# Patient Record
Sex: MALE | Race: WHITE
[De-identification: names, ages, dates, MRNs, and addresses within clinical notes are randomized per-mention and may not be internally consistent; named-entity substitution may affect disease eponyms.]

---

## 2017-08-03 ENCOUNTER — HOSPITAL ENCOUNTER (OUTPATIENT)
Dept: HOSPITAL 58 - LAB | Age: 82
Discharge: HOME | End: 2017-08-03
Attending: GENERAL PRACTICE

## 2017-08-03 VITALS — BODY MASS INDEX: 26.9 KG/M2

## 2017-08-03 DIAGNOSIS — E03.9: Primary | ICD-10-CM

## 2017-08-03 DIAGNOSIS — Z79.899: ICD-10-CM

## 2017-08-03 DIAGNOSIS — I10: ICD-10-CM

## 2017-08-03 LAB
ADD URINE MICROSCOPIC: YES
ALBUMIN SERPL-MCNC: 4.4 G/DL (ref 3.4–5)
ALBUMIN/GLOB SERPL: 1.33 {RATIO}
ALP SERPL-CCNC: 88 U/L (ref 56–119)
ALT SERPL-CCNC: 26 U/L (ref 12–78)
ANION GAP SERPL CALC-SCNC: 18.7 MMOL/L
AST SERPL-CCNC: 27 U/L (ref 15–37)
BASOPHILS # BLD AUTO: 0.1 K/UL (ref 0–0.2)
BASOPHILS NFR BLD AUTO: 1.4 % (ref 0–3)
BILIRUB SERPL-MCNC: 0.73 MG/DL (ref 0–1.2)
BUN SERPL-MCNC: 23 MG/DL (ref 7–18)
BUN/CREAT SERPL: 20.35
CALCIUM SERPL-MCNC: 10.3 MG/DL (ref 8.2–10.2)
CHLORIDE SERPL-SCNC: 102 MMOL/L (ref 98–107)
CHOLEST SERPL-MCNC: 192 MG/DL (ref 0–200)
CHOLEST/HDLC SERPL: 5.5 {RATIO} (ref 4.5–6.4)
CO2 BLD-SCNC: 27 MMOL/L (ref 23–31)
CREAT SERPL-MCNC: 1.13 MG/DL (ref 0.6–1.1)
EOSINOPHIL # BLD AUTO: 0.2 K/UL (ref 0–0.7)
EOSINOPHIL NFR BLD AUTO: 5.6 % (ref 0–7)
GFR SERPLBLD BASED ON 1.73 SQ M-ARVRAT: 62 ML/MIN
GLOBULIN SER CALC-MCNC: 3.3 G/L
GLUCOSE SERPL-MCNC: 102 MG/DL (ref 82–115)
HCT VFR BLD AUTO: 45.3 % (ref 42–52)
HDLC SERPL-MCNC: 35 MG/DL (ref 35–60)
HGB BLD-MCNC: 15.4 G/DL (ref 14–18)
IMM GRANULOCYTES NFR BLD AUTO: 0.2 % (ref 0–5)
LYMPHOCYTES # SPEC AUTO: 1.4 K/UL (ref 0.6–3.4)
LYMPHOCYTES NFR BLD AUTO: 33 % (ref 10–50)
MCH RBC QN: 30.5 PG (ref 27–31)
MCHC RBC AUTO-ENTMCNC: 34 G/DL (ref 31.8–35.4)
MCV RBC: 89.7 FL (ref 80–94)
MONOCYTES # BLD AUTO: 0.4 K/UL (ref 0.4–2)
MONOCYTES NFR BLD AUTO: 10.3 % (ref 0–10)
NEUTROPHILS # BLD AUTO: 2.1 K/UL (ref 2–6.9)
NEUTROPHILS NFR BLD AUTO: 49.5 %
PH UR: 5.5 [PH] (ref 5–9)
PLATELET # BLD AUTO: 220 10^3/UL (ref 140–440)
POTASSIUM SERPL-SCNC: 4.7 MMOL/L (ref 3.5–5.1)
PROT SERPL-MCNC: 7.7 G/DL (ref 5.8–8.1)
RBC # BLD AUTO: 5.05 10^6/UL (ref 4.7–6.1)
RBC UR QL AUTO: (no result)
SODIUM SERPL-SCNC: 143 MMOL/L (ref 136–145)
SP GR UR: 1.02 (ref 1–1.03)
TRIGL SERPL-MCNC: 209 MG/DL (ref 30–150)
VLDLC SERPL CALC-MCNC: 42 MG/DL (ref 2–30)
WBC # BLD AUTO: 4.27 K/UL (ref 4.2–10.2)

## 2017-08-03 PROCEDURE — 84443 ASSAY THYROID STIM HORMONE: CPT

## 2017-08-03 PROCEDURE — 80061 LIPID PANEL: CPT

## 2017-08-03 PROCEDURE — 36415 COLL VENOUS BLD VENIPUNCTURE: CPT

## 2017-08-03 PROCEDURE — 81001 URINALYSIS AUTO W/SCOPE: CPT

## 2017-08-03 PROCEDURE — 80053 COMPREHEN METABOLIC PANEL: CPT

## 2017-08-03 PROCEDURE — 85025 COMPLETE CBC W/AUTO DIFF WBC: CPT

## 2017-08-10 ENCOUNTER — HOSPITAL ENCOUNTER (OUTPATIENT)
Dept: HOSPITAL 58 - LAB | Age: 82
Discharge: HOME | End: 2017-08-10
Attending: GENERAL PRACTICE

## 2017-08-10 VITALS — BODY MASS INDEX: 26.9 KG/M2

## 2017-08-10 DIAGNOSIS — R31.29: Primary | ICD-10-CM

## 2017-08-10 LAB
ADD URINE MICROSCOPIC: YES
PH UR: 5.5 [PH] (ref 5–9)
SP GR UR: 1.01 (ref 1–1.03)

## 2017-08-10 PROCEDURE — 81001 URINALYSIS AUTO W/SCOPE: CPT

## 2017-08-11 ENCOUNTER — HOSPITAL ENCOUNTER (OUTPATIENT)
Dept: HOSPITAL 58 - RAD | Age: 82
Discharge: HOME | End: 2017-08-11
Attending: GENERAL PRACTICE

## 2017-08-11 VITALS — BODY MASS INDEX: 26.9 KG/M2

## 2017-08-11 DIAGNOSIS — R31.29: Primary | ICD-10-CM

## 2017-08-11 PROCEDURE — 76770 US EXAM ABDO BACK WALL COMP: CPT

## 2017-08-11 NOTE — US
EXAM:  RENAL ULTRASOUND, BILATERAL 

  

HISTORY: Microhematuria 

  

FINDINGS:  Ultrasound renal, bilateral.  Gray-scale ultrasound and color Doppler imaging was perform
ed. 

  

The right kidney measures 11.5 x 5.5 x 5.0 centimeters. 

The left kidney measures 10.4 x 4.9 x 4.5. 

  

General renal cortical echogenicity and volume were normal for age.  No hydronephrosis was identifie
d.  No obvious cystic or solid renal cortical masses.  Urinary bladder was relatively decompressed a
nd therefore not optimally evaluated on this exam.  No gross urinary bladder abnormalities.  Uretera
l jets were not seen during the exam. 

  

  

IMPRESSION: 

No definite abnormalities or etiology for hematuria.  Consider further workup.

## 2017-12-05 ENCOUNTER — HOSPITAL ENCOUNTER (OUTPATIENT)
Dept: HOSPITAL 58 - LAB | Age: 82
Discharge: HOME | End: 2017-12-05
Attending: GENERAL PRACTICE

## 2017-12-05 VITALS — BODY MASS INDEX: 26.9 KG/M2

## 2017-12-05 DIAGNOSIS — I10: Primary | ICD-10-CM

## 2017-12-05 DIAGNOSIS — Z79.899: ICD-10-CM

## 2017-12-05 LAB
ADD URINE MICROSCOPIC: YES
ALBUMIN SERPL-MCNC: 4.2 G/DL (ref 3.4–5)
ALBUMIN/GLOB SERPL: 1.35 {RATIO}
ALP SERPL-CCNC: 88 U/L (ref 56–119)
ALT SERPL-CCNC: 17 U/L (ref 12–78)
ANION GAP SERPL CALC-SCNC: 17 MMOL/L
AST SERPL-CCNC: 23 U/L (ref 15–37)
BACTERIA URNS QL MICRO: (no result)
BASOPHILS # BLD AUTO: 0.1 K/UL (ref 0–0.2)
BASOPHILS NFR BLD AUTO: 1.1 % (ref 0–3)
BILIRUB SERPL-MCNC: 0.64 MG/DL (ref 0–1.2)
BUN SERPL-MCNC: 19 MG/DL (ref 7–18)
BUN/CREAT SERPL: 18.62
CALCIUM SERPL-MCNC: 9.7 MG/DL (ref 8.2–10.2)
CHLORIDE SERPL-SCNC: 104 MMOL/L (ref 98–107)
CO2 BLD-SCNC: 24 MMOL/L (ref 23–31)
CREAT SERPL-MCNC: 1.02 MG/DL (ref 0.6–1.1)
EOSINOPHIL # BLD AUTO: 0.1 K/UL (ref 0–0.7)
EOSINOPHIL NFR BLD AUTO: 2.8 % (ref 0–7)
GFR SERPLBLD BASED ON 1.73 SQ M-ARVRAT: 70 ML/MIN
GLOBULIN SER CALC-MCNC: 3.1 G/L
GLUCOSE SERPL-MCNC: 104 MG/DL (ref 82–115)
HCT VFR BLD AUTO: 44.2 % (ref 42–52)
HGB BLD-MCNC: 15.1 G/DL (ref 14–18)
IMM GRANULOCYTES NFR BLD AUTO: 0.2 % (ref 0–5)
LYMPHOCYTES # SPEC AUTO: 1.3 K/UL (ref 0.6–3.4)
LYMPHOCYTES NFR BLD AUTO: 28.5 % (ref 10–50)
MCH RBC QN: 30.4 PG (ref 27–31)
MCHC RBC AUTO-ENTMCNC: 34.2 G/DL (ref 31.8–35.4)
MCV RBC: 89.1 FL (ref 80–94)
MONOCYTES # BLD AUTO: 0.5 K/UL (ref 0.4–2)
MONOCYTES NFR BLD AUTO: 10.4 % (ref 0–10)
NEUTROPHILS # BLD AUTO: 2.6 K/UL (ref 2–6.9)
NEUTROPHILS NFR BLD AUTO: 57 %
PH UR: 5.5 [PH] (ref 5–9)
PLATELET # BLD AUTO: 234 10^3/UL (ref 140–440)
POTASSIUM SERPL-SCNC: 4 MMOL/L (ref 3.5–5.1)
PROT SERPL-MCNC: 7.3 G/DL (ref 5.8–8.1)
RBC # BLD AUTO: 4.96 10^6/UL (ref 4.7–6.1)
RBC UR QL AUTO: (no result)
SODIUM SERPL-SCNC: 141 MMOL/L (ref 136–145)
SP GR UR: 1.02 (ref 1–1.03)
WBC # BLD AUTO: 4.6 K/UL (ref 4.2–10.2)

## 2017-12-05 PROCEDURE — 36415 COLL VENOUS BLD VENIPUNCTURE: CPT

## 2017-12-05 PROCEDURE — 85025 COMPLETE CBC W/AUTO DIFF WBC: CPT

## 2017-12-05 PROCEDURE — 81001 URINALYSIS AUTO W/SCOPE: CPT

## 2017-12-05 PROCEDURE — 80053 COMPREHEN METABOLIC PANEL: CPT

## 2017-12-13 ENCOUNTER — HOSPITAL ENCOUNTER (OUTPATIENT)
Dept: HOSPITAL 58 - RAD | Age: 82
Discharge: HOME | End: 2017-12-13
Attending: GENERAL PRACTICE
Payer: COMMERCIAL

## 2017-12-13 VITALS — BODY MASS INDEX: 26.9 KG/M2

## 2017-12-13 DIAGNOSIS — R31.9: Primary | ICD-10-CM

## 2017-12-13 PROCEDURE — 76770 US EXAM ABDO BACK WALL COMP: CPT

## 2017-12-13 NOTE — US
EXAM:  Renal ultrasound 

  

HISTORY:  Hematuria, unspecified 

  

COMPARISON:  None 

  

TECHNIQUE:  08/11/2017 

  

FINDINGS:  Right kidney measures 6.0 x 4.0 x 10.5 cm.  Left kidney measures 5.7 x 3.9 x 10.1 cm.  Curtis
al cortical echogenicity is increased with bilateral renal cortical thinning.  No hydronephrosis or r
enal calculus large enough to cause acoustic shadowing.  Bladder only mildly distended and poorly elder
luated, grossly unremarkable.  A right ureteral jet is visualized.  No left ureteral jet visualized 

  

IMPRESSION: 

1.  No hydronephrosis. 

2.  Medical renal disease

## 2018-04-05 ENCOUNTER — HOSPITAL ENCOUNTER (OUTPATIENT)
Dept: HOSPITAL 58 - FCC-LAB | Age: 83
Discharge: HOME | End: 2018-04-05
Attending: GENERAL PRACTICE
Payer: COMMERCIAL

## 2018-04-05 VITALS — BODY MASS INDEX: 26.9 KG/M2

## 2018-04-05 DIAGNOSIS — Z12.5: ICD-10-CM

## 2018-04-05 DIAGNOSIS — E03.9: Primary | ICD-10-CM

## 2018-04-05 DIAGNOSIS — Z79.899: ICD-10-CM

## 2018-04-05 DIAGNOSIS — I10: ICD-10-CM

## 2018-04-05 PROCEDURE — 80061 LIPID PANEL: CPT

## 2018-04-05 PROCEDURE — 85025 COMPLETE CBC W/AUTO DIFF WBC: CPT

## 2018-04-05 PROCEDURE — 36415 COLL VENOUS BLD VENIPUNCTURE: CPT

## 2018-04-05 PROCEDURE — 84443 ASSAY THYROID STIM HORMONE: CPT

## 2018-04-05 PROCEDURE — 80053 COMPREHEN METABOLIC PANEL: CPT

## 2018-04-05 PROCEDURE — 81001 URINALYSIS AUTO W/SCOPE: CPT

## 2018-08-06 ENCOUNTER — HOSPITAL ENCOUNTER (OUTPATIENT)
Dept: HOSPITAL 58 - FCC-LAB | Age: 83
Discharge: HOME | End: 2018-08-06
Attending: GENERAL PRACTICE

## 2018-08-06 VITALS — BODY MASS INDEX: 26.9 KG/M2

## 2018-08-06 DIAGNOSIS — M15.9: ICD-10-CM

## 2018-08-06 DIAGNOSIS — Z79.899: ICD-10-CM

## 2018-08-06 DIAGNOSIS — I10: ICD-10-CM

## 2018-08-06 DIAGNOSIS — E03.9: Primary | ICD-10-CM

## 2018-08-06 PROCEDURE — 36415 COLL VENOUS BLD VENIPUNCTURE: CPT

## 2018-08-06 PROCEDURE — 81001 URINALYSIS AUTO W/SCOPE: CPT

## 2018-08-06 PROCEDURE — 80053 COMPREHEN METABOLIC PANEL: CPT

## 2018-08-06 PROCEDURE — 80061 LIPID PANEL: CPT

## 2018-08-06 PROCEDURE — 85025 COMPLETE CBC W/AUTO DIFF WBC: CPT

## 2019-04-08 ENCOUNTER — HOSPITAL ENCOUNTER (OUTPATIENT)
Dept: HOSPITAL 58 - RHC-LAB | Age: 84
Discharge: HOME | End: 2019-04-08
Attending: GENERAL PRACTICE
Payer: COMMERCIAL

## 2019-04-08 VITALS — BODY MASS INDEX: 26.9 KG/M2

## 2019-04-08 DIAGNOSIS — E03.9: Primary | ICD-10-CM

## 2019-04-08 DIAGNOSIS — Z79.899: ICD-10-CM

## 2019-04-08 DIAGNOSIS — M15.9: ICD-10-CM

## 2019-04-08 DIAGNOSIS — I10: ICD-10-CM

## 2019-04-08 DIAGNOSIS — A69.23: ICD-10-CM

## 2019-04-08 DIAGNOSIS — Z12.5: ICD-10-CM

## 2019-04-08 DIAGNOSIS — M25.569: ICD-10-CM

## 2019-04-08 PROCEDURE — 80061 LIPID PANEL: CPT

## 2019-04-08 PROCEDURE — 84550 ASSAY OF BLOOD/URIC ACID: CPT

## 2019-04-08 PROCEDURE — 81001 URINALYSIS AUTO W/SCOPE: CPT

## 2019-04-08 PROCEDURE — 36415 COLL VENOUS BLD VENIPUNCTURE: CPT

## 2019-04-08 PROCEDURE — 80053 COMPREHEN METABOLIC PANEL: CPT

## 2019-04-12 ENCOUNTER — HOSPITAL ENCOUNTER (OUTPATIENT)
Dept: HOSPITAL 58 - RHC-LAB | Age: 84
Discharge: HOME | End: 2019-04-12
Attending: GENERAL PRACTICE

## 2019-04-12 VITALS — BODY MASS INDEX: 26.9 KG/M2

## 2019-04-12 DIAGNOSIS — A69.23: ICD-10-CM

## 2019-04-12 DIAGNOSIS — I10: ICD-10-CM

## 2019-04-12 DIAGNOSIS — E03.9: Primary | ICD-10-CM

## 2019-04-12 DIAGNOSIS — M15.9: ICD-10-CM

## 2019-04-12 DIAGNOSIS — Z79.899: ICD-10-CM

## 2019-04-12 DIAGNOSIS — M25.569: ICD-10-CM

## 2019-04-12 DIAGNOSIS — Z12.5: ICD-10-CM

## 2019-04-12 PROCEDURE — 36415 COLL VENOUS BLD VENIPUNCTURE: CPT

## 2019-04-12 PROCEDURE — 85025 COMPLETE CBC W/AUTO DIFF WBC: CPT
